# Patient Record
Sex: MALE | Race: WHITE | NOT HISPANIC OR LATINO | Employment: FULL TIME | RURAL
[De-identification: names, ages, dates, MRNs, and addresses within clinical notes are randomized per-mention and may not be internally consistent; named-entity substitution may affect disease eponyms.]

---

## 2017-01-29 ENCOUNTER — OFFICE VISIT (OUTPATIENT)
Dept: RETAIL CLINIC | Facility: CLINIC | Age: 27
End: 2017-01-29

## 2017-01-29 VITALS
TEMPERATURE: 97.1 F | WEIGHT: 180 LBS | BODY MASS INDEX: 25.2 KG/M2 | OXYGEN SATURATION: 98 % | SYSTOLIC BLOOD PRESSURE: 130 MMHG | DIASTOLIC BLOOD PRESSURE: 70 MMHG | HEART RATE: 80 BPM | HEIGHT: 71 IN

## 2017-01-29 DIAGNOSIS — J06.9 ACUTE URI: Primary | ICD-10-CM

## 2017-01-29 PROCEDURE — 99213 OFFICE O/P EST LOW 20 MIN: CPT | Performed by: NURSE PRACTITIONER

## 2017-01-29 RX ORDER — AZITHROMYCIN 250 MG/1
TABLET, FILM COATED ORAL
Qty: 6 TABLET | Refills: 0 | Status: SHIPPED | OUTPATIENT
Start: 2017-01-29 | End: 2017-01-29

## 2017-01-29 RX ORDER — CETIRIZINE HYDROCHLORIDE 10 MG/1
10 TABLET ORAL DAILY
COMMUNITY

## 2017-01-29 RX ORDER — VITAMIN B COMPLEX
CAPSULE ORAL DAILY
COMMUNITY

## 2017-01-29 RX ORDER — FLUTICASONE PROPIONATE 50 MCG
2 SPRAY, SUSPENSION (ML) NASAL DAILY
COMMUNITY

## 2017-01-29 RX ORDER — AZITHROMYCIN 250 MG/1
TABLET, FILM COATED ORAL
Qty: 6 TABLET | Refills: 0 | Status: SHIPPED | OUTPATIENT
Start: 2017-01-29

## 2017-01-29 NOTE — PROGRESS NOTES
"Subjective   Jacquelyn Hampton is a 26 y.o. male. Patient comes into clinic today with concerns regarding:     \"Respiratory Infection.\"    History of Present Illness  Sx have been present x 5 days. Patient says he was seen at local urgent care clinic 4 days ago, and was given steroid injection, oral and nasal antihistamines. Sx have worsened since that day. Sinus congestion, HA, PND, voice change, chest pain (patient attributes this to congestion), cough with green mucus production.     The following portions of the patient's history were reviewed and updated as appropriate: allergies, current medications, past family history, past medical history, past social history, past surgical history and problem list.    Review of Systems   Constitutional: Negative for chills and fever.   HENT: Positive for congestion, postnasal drip, rhinorrhea, sore throat and voice change. Negative for ear pain.    Respiratory: Positive for cough. Negative for shortness of breath.    Cardiovascular: Positive for chest pain.   Gastrointestinal: Negative for abdominal pain, diarrhea, nausea and vomiting.   Neurological: Positive for headaches.       No Known Allergies    Past Medical History   Diagnosis Date   • ADHD (attention deficit hyperactivity disorder)          Current Outpatient Prescriptions:   •  B Complex Vitamins (VITAMIN B COMPLEX) capsule capsule, Take  by mouth Daily., Disp: , Rfl:   •  cetirizine (zyrTEC) 10 MG tablet, Take 10 mg by mouth Daily., Disp: , Rfl:   •  fluticasone (FLONASE) 50 MCG/ACT nasal spray, 2 sprays into each nostril Daily., Disp: , Rfl:   •  azithromycin (ZITHROMAX Z-TEREZA) 250 MG tablet, Take 2 tablets the first day, then 1 tablet daily for 4 days., Disp: 6 tablet, Rfl: 0    Objective   Physical Exam   Constitutional: He is oriented to person, place, and time. He appears well-developed and well-nourished.   HENT:   Right Ear: External ear and ear canal normal. Tympanic membrane is not injected and not " "erythematous. A middle ear effusion is present.   Left Ear: External ear and ear canal normal. Tympanic membrane is not injected and not erythematous. A middle ear effusion is present.   Nose: Mucosal edema and rhinorrhea present. Right sinus exhibits no maxillary sinus tenderness and no frontal sinus tenderness. Left sinus exhibits no maxillary sinus tenderness and no frontal sinus tenderness.   Mouth/Throat: Uvula is midline. Posterior oropharyngeal erythema present. No oropharyngeal exudate or posterior oropharyngeal edema. Tonsils are 1+ on the right. Tonsils are 1+ on the left. No tonsillar exudate.   Eyes: Conjunctivae are normal. Pupils are equal, round, and reactive to light.   Neck: Neck supple. No thyromegaly present.   Cardiovascular: Normal rate and regular rhythm.    Pulmonary/Chest: Effort normal.   Intermittent coarseness noted in bilateral upper lobes    Lymphadenopathy:     He has no cervical adenopathy.   Neurological: He is alert and oriented to person, place, and time.   CN II-XII grossly intact   Skin: Skin is warm and dry.   Psychiatric: He has a normal mood and affect. His behavior is normal.   Vitals reviewed.      Visit Vitals   • /70 (BP Location: Left arm, Patient Position: Sitting, Cuff Size: Adult)   • Pulse 80   • Temp 97.1 °F (36.2 °C) (Tympanic)   • Ht 71\" (180.3 cm)   • Wt 180 lb (81.6 kg)   • SpO2 98%   • BMI 25.1 kg/m2       Assessment/Plan   Jacquelyn was seen today for uri.    Diagnoses and all orders for this visit:    Acute URI  -     Discontinue: azithromycin (ZITHROMAX Z-TEREZA) 250 MG tablet; Take 2 tablets the first day, then 1 tablet daily for 4 days.  -     azithromycin (ZITHROMAX Z-TEREZA) 250 MG tablet; Take 2 tablets the first day, then 1 tablet daily for 4 days.     -Discussed dx with Patient. Home, rest, increase fluids.   -Medication administration instructions given  -If sx worsen or do not improve seek higher level care  -Patient expressed verbal understanding of " plan of care and rationale for interventions.    Declined work excuse note.

## 2017-01-29 NOTE — PATIENT INSTRUCTIONS
-Discussed dx with Patient. Home, rest, increase fluids.   -Medication administration instructions given  -If sx worsen or do not improve seek higher level care  -Patient expressed verbal understanding of plan of care and rationale for interventions.    Declined work excuse note.